# Patient Record
Sex: MALE | Race: ASIAN | NOT HISPANIC OR LATINO | URBAN - METROPOLITAN AREA
[De-identification: names, ages, dates, MRNs, and addresses within clinical notes are randomized per-mention and may not be internally consistent; named-entity substitution may affect disease eponyms.]

---

## 2017-03-23 ENCOUNTER — TELEPHONE (OUTPATIENT)
Dept: OTHER | Facility: CLINIC | Age: 32
End: 2017-03-23

## 2017-03-23 NOTE — TELEPHONE ENCOUNTER
Call Regarding Onboarding P1 MMB    Attempt 1    Message on voicemail     Comments: 1 SPOUSE STILL NEEDING ONBAORDING      Outreach   Carolyn Manzanares

## 2017-11-21 ENCOUNTER — OFFICE VISIT (OUTPATIENT)
Dept: PEDIATRICS | Facility: CLINIC | Age: 32
End: 2017-11-21
Payer: COMMERCIAL

## 2017-11-21 VITALS
DIASTOLIC BLOOD PRESSURE: 70 MMHG | OXYGEN SATURATION: 99 % | HEART RATE: 65 BPM | TEMPERATURE: 98 F | WEIGHT: 177.1 LBS | HEIGHT: 70 IN | BODY MASS INDEX: 25.35 KG/M2 | SYSTOLIC BLOOD PRESSURE: 108 MMHG

## 2017-11-21 DIAGNOSIS — Z00.00 ROUTINE GENERAL MEDICAL EXAMINATION AT A HEALTH CARE FACILITY: Primary | ICD-10-CM

## 2017-11-21 LAB
CHOLEST SERPL-MCNC: 141 MG/DL
GLUCOSE SERPL-MCNC: 92 MG/DL (ref 70–99)
HDLC SERPL-MCNC: 41 MG/DL
LDLC SERPL CALC-MCNC: 69 MG/DL
NONHDLC SERPL-MCNC: 100 MG/DL
TRIGL SERPL-MCNC: 156 MG/DL

## 2017-11-21 PROCEDURE — 99385 PREV VISIT NEW AGE 18-39: CPT | Performed by: INTERNAL MEDICINE

## 2017-11-21 PROCEDURE — 36415 COLL VENOUS BLD VENIPUNCTURE: CPT | Performed by: INTERNAL MEDICINE

## 2017-11-21 PROCEDURE — 82947 ASSAY GLUCOSE BLOOD QUANT: CPT | Performed by: INTERNAL MEDICINE

## 2017-11-21 PROCEDURE — 80061 LIPID PANEL: CPT | Performed by: INTERNAL MEDICINE

## 2017-11-21 NOTE — MR AVS SNAPSHOT
After Visit Summary   11/21/2017    Dung Hunter    MRN: [id number]           Patient Information     Date Of Birth          1985        Visit Information        Provider Department      11/21/2017 8:50 AM Iesha Vargas Mai, MD [facility] [place]        Today's Diagnoses     Routine general medical examination at a health care facility    -  1      Care Instructions    Thumb Pain:  - Apply ice for 20 minutes twice daily  - Take ibuprofen 400 mg as needed  - Observe - this should resolve on it's own    Preventive Health Recommendations  Male Ages 26 - 39    Yearly exam:             See your health care provider every year in order to  o   Review health changes.   o   Discuss preventive care.    o   Review your medicines if your doctor has prescribed any.    You should be tested each year for STDs (sexually transmitted diseases), if you re at risk.     After age 35, talk to your provider about cholesterol testing. If you are at risk for heart disease, have your cholesterol tested at least every 5 years.     If you are at risk for diabetes, you should have a diabetes test (fasting glucose).  Shots: Get a flu shot each year. Get a tetanus shot every 10 years.     Nutrition:    Eat at least 5 servings of fruits and vegetables daily.     Eat whole-grain bread, whole-wheat pasta and brown rice instead of white grains and rice.     Talk to your provider about Calcium and Vitamin D.     Lifestyle    Exercise for at least 150 minutes a week (30 minutes a day, 5 days a week). This will help you control your weight and prevent disease.     Limit alcohol to one drink per day.     No smoking.     Wear sunscreen to prevent skin cancer.     See your dentist every six months for an exam and cleaning.             Follow-ups after your visit        Who to contact     If you have questions or need follow up information about today's clinic visit or your schedule please contact [facility] [place]  "directly at [phone].  Normal or non-critical lab and imaging results will be communicated to you by Fit Stepshart, letter or phone within 4 business days after the clinic has received the results. If you do not hear from us within 7 days, please contact the clinic through Fit Stepshart or phone. If you have a critical or abnormal lab result, we will notify you by phone as soon as possible.  Submit refill requests through Headroom or call your pharmacy and they will forward the refill request to us. Please allow 3 business days for your refill to be completed.          Additional Information About Your Visit        Fit Stepshart Information     Headroom lets you send messages to your doctor, view your test results, renew your prescriptions, schedule appointments and more. To sign up, go to [email or web address] . Click on \"Log in\" on the left side of the screen, which will take you to the Welcome page. Then click on \"Sign up Now\" on the right side of the page.     You will be asked to enter the access code listed below, as well as some personal information. Please follow the directions to create your username and password.     Your access code is: [email or web address]  Expires: 2018  9:25 AM     Your access code will  in 90 days. If you need help or a new code, please call your [facility] clinic or [phone].        Care EveryWhere ID     This is your Care EveryWhere ID. This could be used by other organizations to access your [facility] medical records  [id number]        Your Vitals Were     Pulse Temperature Height Pulse Oximetry BMI (Body Mass Index)       65 98  F (36.7  C) (Oral) 5' 9.75\" (1.772 m) 99% 25.59 kg/m2        Blood Pressure from Last 3 Encounters:   17 108/70    Weight from Last 3 Encounters:   17 177 lb 1.6 oz (80.3 kg)              We Performed the Following     Glucose whole blood     Lipid panel reflex to direct LDL Fasting        Primary Care Provider Fax #    Provider Not In System " [phone]                Equal Access to Services     [facility] : Hadii aad ku hadchacepawan Schmitt, morenita wu, isaac turcios. So [facility] [phone].    ATENCIÓN: Si habla español, tiene a dasilva disposición servicios gratuitos de asistencia lingüística. Llame al [phone].    We comply with applicable federal civil rights laws and [place] laws. We do not discriminate on the basis of race, color, national origin, age, disability, sex, sexual orientation, or gender identity.            Thank you!     Thank you for choosing [facility] [place]  for your care. Our goal is always to provide you with excellent care. Hearing back from our patients is one way we can continue to improve our services. Please take a few minutes to complete the written survey that you may receive in the mail after your visit with us. Thank you!             Your Updated Medication List - Protect others around you: Learn how to safely use, store and throw away your medicines at www.disposemymeds.org.      Notice  As of 11/21/2017  9:25 AM    You have not been prescribed any medications.

## 2017-11-21 NOTE — PATIENT INSTRUCTIONS
Thumb Pain:  - Apply ice for 20 minutes twice daily  - Take ibuprofen 400 mg as needed  - Observe - this should resolve on it's own    Preventive Health Recommendations  Male Ages 26 - 39    Yearly exam:             See your health care provider every year in order to  o   Review health changes.   o   Discuss preventive care.    o   Review your medicines if your doctor has prescribed any.    You should be tested each year for STDs (sexually transmitted diseases), if you re at risk.     After age 35, talk to your provider about cholesterol testing. If you are at risk for heart disease, have your cholesterol tested at least every 5 years.     If you are at risk for diabetes, you should have a diabetes test (fasting glucose).  Shots: Get a flu shot each year. Get a tetanus shot every 10 years.     Nutrition:    Eat at least 5 servings of fruits and vegetables daily.     Eat whole-grain bread, whole-wheat pasta and brown rice instead of white grains and rice.     Talk to your provider about Calcium and Vitamin D.     Lifestyle    Exercise for at least 150 minutes a week (30 minutes a day, 5 days a week). This will help you control your weight and prevent disease.     Limit alcohol to one drink per day.     No smoking.     Wear sunscreen to prevent skin cancer.     See your dentist every six months for an exam and cleaning.

## 2017-11-21 NOTE — PROGRESS NOTES
"SUBJECTIVE:   CC: Dung Hunter is an 32 year old male who presents for preventative health visit.     Physical   Annual:     Getting at least 3 servings of Calcium per day::  Yes    Bi-annual eye exam::  Yes    Dental care twice a year::  Yes    Sleep apnea or symptoms of sleep apnea::  None    Diet::  Regular (no restrictions)    Frequency of exercise::  2-3 days/week    Duration of exercise::  45-60 minutes    Taking medications regularly::  Yes    Medication side effects::  None    Additional concerns today::  YES (left thumb pain for a few weeks, injured from playing volleyball)      Today's PHQ-2 Score: No flowsheet data found.    Abuse: Current or Past(Physical, Sexual or Emotional)- No  Do you feel safe in your environment - Yes    Social History   Substance Use Topics     Smoking status: Not on file     Smokeless tobacco: Not on file     Alcohol use Not on file     The patient does not drink >3 drinks per day nor >7 drinks per week.    Last PSA: No results found for: PSA    Reviewed orders with patient. Reviewed health maintenance and updated orders accordingly - Yes      Reviewed and updated as needed this visit by clinical staff         Reviewed and updated as needed this visit by Provider        History reviewed. No pertinent past medical history.   History reviewed. No pertinent surgical history.    Review of Systems  All other systems on a 10-point review are negative, unless otherwise noted in HPI      OBJECTIVE:   /70 (BP Location: Right arm, Patient Position: Chair, Cuff Size: Adult Regular)  Pulse 65  Temp 98  F (36.7  C) (Oral)  Ht 5' 9.75\" (1.772 m)  Wt 177 lb 1.6 oz (80.3 kg)  SpO2 99%  BMI 25.59 kg/m2    Physical Exam  GENERAL: healthy, alert and no distress  EYES: Eyes grossly normal to inspection, PERRL and conjunctivae and sclerae normal  HENT: ear canals and TM's normal, nose and mouth without ulcers or lesions  NECK: no adenopathy, no asymmetry, masses, or scars and " thyroid normal to palpation  RESP: lungs clear to auscultation - no rales, rhonchi or wheezes  CV: regular rate and rhythm, normal S1 S2, no S3 or S4, no murmur, click or rub, no peripheral edema and peripheral pulses strong  ABDOMEN: soft, nontender, no hepatosplenomegaly, no masses and bowel sounds normal  MS: no gross musculoskeletal defects noted, no edema  NEURO: Normal strength and tone, mentation intact and speech normal    ASSESSMENT/PLAN:   1. Routine general medical examination at a health care facility  - Lipid panel reflex to direct LDL Fasting  - Glucose whole blood    COUNSELING:   Reviewed preventive health counseling, as reflected in patient instructions           has no tobacco history on file.      There is no height or weight on file to calculate BMI.         Counseling Resources:  ATP IV Guidelines  Pooled Cohorts Equation Calculator  FRAX Risk Assessment  ICSI Preventive Guidelines  Dietary Guidelines for Americans, 2010  USDA's MyPlate  ASA Prophylaxis  Lung CA Screening    Nick Vargas MD  [facility]

## 2017-11-21 NOTE — NURSING NOTE
"Chief Complaint   Patient presents with     Physical       Initial /70 (BP Location: Right arm, Patient Position: Chair, Cuff Size: Adult Regular)  Pulse 65  Temp 98  F (36.7  C) (Oral)  Ht 5' 9.75\" (1.772 m)  Wt 177 lb 1.6 oz (80.3 kg)  SpO2 99%  BMI 25.59 kg/m2 Estimated body mass index is 25.59 kg/(m^2) as calculated from the following:    Height as of this encounter: 5' 9.75\" (1.772 m).    Weight as of this encounter: 177 lb 1.6 oz (80.3 kg).  Medication Reconciliation: complete   Annalise Harrell MA 9:08 AM 11/21/2017     "

## 2018-01-22 ENCOUNTER — OFFICE VISIT (OUTPATIENT)
Dept: URGENT CARE | Facility: URGENT CARE | Age: 33
End: 2018-01-22
Payer: COMMERCIAL

## 2018-01-22 VITALS
HEART RATE: 76 BPM | SYSTOLIC BLOOD PRESSURE: 112 MMHG | OXYGEN SATURATION: 99 % | TEMPERATURE: 97.4 F | BODY MASS INDEX: 27.36 KG/M2 | WEIGHT: 189.3 LBS | DIASTOLIC BLOOD PRESSURE: 69 MMHG

## 2018-01-22 DIAGNOSIS — Z20.828 EXPOSURE TO INFLUENZA: Primary | ICD-10-CM

## 2018-01-22 LAB
FLUAV+FLUBV AG SPEC QL: NEGATIVE
FLUAV+FLUBV AG SPEC QL: NEGATIVE
SPECIMEN SOURCE: NORMAL

## 2018-01-22 PROCEDURE — 87804 INFLUENZA ASSAY W/OPTIC: CPT | Performed by: PHYSICIAN ASSISTANT

## 2018-01-22 PROCEDURE — 99213 OFFICE O/P EST LOW 20 MIN: CPT | Performed by: FAMILY MEDICINE

## 2018-01-22 RX ORDER — OSELTAMIVIR PHOSPHATE 75 MG/1
75 CAPSULE ORAL DAILY
Qty: 10 CAPSULE | Refills: 0 | Status: SHIPPED | OUTPATIENT
Start: 2018-01-22

## 2018-01-22 NOTE — PROGRESS NOTES
SUBJECTIVE:   Dung Hunter is a 32 year old male who present complaining of flu-like symptoms: fevers, chills, myalgias, congestion, sore throat and cough for 2 days. Denies dyspnea or wheezing.    OBJECTIVE:  Appears moderately ill but not toxic; temperature as noted in vitals. Ears normal. Throat and pharynx normal.  Neck supple. No adenopathy in the neck. Sinuses non tender. The chest is clear.    ASSESSMENT:  Influenza; like symptoms.  Has a wife at home who has fluid in the child we will treat him for preventative measures with Tamiflu for 10 days    PLAN:  Symptomatic therapy suggested: rest, increase fluids and call prn if symptoms persist or worsen. Call or return to clinic prn if these symptoms worsen or fail to improve as anticipated.

## 2018-01-22 NOTE — NURSING NOTE
"Chief Complaint   Patient presents with     Urgent Care     Headache     Pt states having headache and fatigue, states wife has flu.        Initial /69 (BP Location: Right arm, Patient Position: Chair, Cuff Size: Adult Regular)  Pulse 76  Temp 97.4  F (36.3  C) (Tympanic)  Wt 189 lb 4.8 oz (85.9 kg)  SpO2 99%  BMI 27.36 kg/m2 Estimated body mass index is 27.36 kg/(m^2) as calculated from the following:    Height as of 11/21/17: 5' 9.75\" (1.772 m).    Weight as of this encounter: 189 lb 4.8 oz (85.9 kg).  Medication Reconciliation: unable or not appropriate to perform   Jumana Mantilla CMA ([facility]) 1/22/2018 10:43 AM    "

## 2018-01-22 NOTE — MR AVS SNAPSHOT
After Visit Summary   1/22/2018    Dung Hunter    MRN: [id number]           Patient Information     Date Of Birth          1985        Visit Information        Provider Department      1/22/2018 10:25 AM Geo Seth MD [facility]        Today's Diagnoses     Exposure to influenza    -  1       Follow-ups after your visit        Who to contact     If you have questions or need follow up information about today's clinic visit or your schedule please contact [facility] directly at [phone].  Normal or non-critical lab and imaging results will be communicated to you by MxBiodeviceshart, letter or phone within 4 business days after the clinic has received the results. If you do not hear from us within 7 days, please contact the clinic through Flip Flop ShopsÂ®t or phone. If you have a critical or abnormal lab result, we will notify you by phone as soon as possible.  Submit refill requests through Coskata or call your pharmacy and they will forward the refill request to us. Please allow 3 business days for your refill to be completed.          Additional Information About Your Visit        MyChart Information     Coskata gives you secure access to your electronic health record. If you see a primary care provider, you can also send messages to your care team and make appointments. If you have questions, please call your primary care clinic.  If you do not have a primary care provider, please call [phone] and they will assist you.        Care EveryWhere ID     This is your Care EveryWhere ID. This could be used by other organizations to access your [facility] medical records  [id number]        Your Vitals Were     Pulse Temperature Pulse Oximetry BMI (Body Mass Index)          76 97.4  F (36.3  C) (Tympanic) 99% 27.36 kg/m2         Blood Pressure from Last 3 Encounters:   01/22/18 112/69   11/21/17 108/70    Weight from Last 3 Encounters:   01/22/18 189 lb 4.8 oz (85.9  kg)   11/21/17 177 lb 1.6 oz (80.3 kg)              We Performed the Following     Influenza A/B antigen          Today's Medication Changes          These changes are accurate as of: 1/22/18 11:28 AM.  If you have any questions, ask your nurse or doctor.               Start taking these medicines.        Dose/Directions    oseltamivir 75 MG capsule   Commonly known as:  TAMIFLU   Used for:  Exposure to influenza   Started by:  Geo Seth MD        Dose:  75 mg   Take 1 capsule (75 mg) by mouth daily   Quantity:  10 capsule   Refills:  0            Where to get your medicines      These medications were sent to [facility]/pharmacy #[id number] - [place], [place] - [address], [place] [address]     Phone:  [phone]     oseltamivir 75 MG capsule                Primary Care Provider Fax #    Provider Not In System [phone]                Equal Access to Services     [facility]: Hadii brain leroyo Solois, waaxda luqadaha, qaybta kaalmada adebharathyada, isaac diallo . So [facility] [phone].    ATENCIÓN: Si habla español, tiene a dasilva disposición servicios gratuitos de asistencia lingüística. Llame al [phone].    We comply with applicable federal civil rights laws and [place] laws. We do not discriminate on the basis of race, color, national origin, age, disability, sex, sexual orientation, or gender identity.            Thank you!     Thank you for choosing [facility]  for your care. Our goal is always to provide you with excellent care. Hearing back from our patients is one way we can continue to improve our services. Please take a few minutes to complete the written survey that you may receive in the mail after your visit with us. Thank you!             Your Updated Medication List - Protect others around you: Learn how to safely use, store and throw away your medicines at www.disposemymeds.org.          This  list is accurate as of: 1/22/18 11:28 AM.  Always use your most recent med list.                   Brand Name Dispense Instructions for use Diagnosis    oseltamivir 75 MG capsule    TAMIFLU    10 capsule    Take 1 capsule (75 mg) by mouth daily    Exposure to influenza

## 2020-03-10 ENCOUNTER — HEALTH MAINTENANCE LETTER (OUTPATIENT)
Age: 35
End: 2020-03-10

## 2020-12-27 ENCOUNTER — HEALTH MAINTENANCE LETTER (OUTPATIENT)
Age: 35
End: 2020-12-27

## 2021-04-24 ENCOUNTER — HEALTH MAINTENANCE LETTER (OUTPATIENT)
Age: 36
End: 2021-04-24

## 2021-10-09 ENCOUNTER — HEALTH MAINTENANCE LETTER (OUTPATIENT)
Age: 36
End: 2021-10-09

## 2022-05-21 ENCOUNTER — HEALTH MAINTENANCE LETTER (OUTPATIENT)
Age: 37
End: 2022-05-21

## 2022-09-11 ENCOUNTER — HEALTH MAINTENANCE LETTER (OUTPATIENT)
Age: 37
End: 2022-09-11

## 2023-06-03 ENCOUNTER — HEALTH MAINTENANCE LETTER (OUTPATIENT)
Age: 38
End: 2023-06-03